# Patient Record
Sex: MALE | Race: WHITE | Employment: FULL TIME | URBAN - METROPOLITAN AREA
[De-identification: names, ages, dates, MRNs, and addresses within clinical notes are randomized per-mention and may not be internally consistent; named-entity substitution may affect disease eponyms.]

---

## 2021-08-16 ENCOUNTER — HOSPITAL ENCOUNTER (OUTPATIENT)
Dept: PHYSICAL THERAPY | Age: 56
Setting detail: THERAPIES SERIES
Discharge: HOME | End: 2021-08-16
Payer: COMMERCIAL

## 2021-08-16 PROCEDURE — 97161 PT EVAL LOW COMPLEX 20 MIN: CPT

## 2021-08-16 PROCEDURE — 97112 NEUROMUSCULAR REEDUCATION: CPT

## 2021-08-16 PROCEDURE — 97110 THERAPEUTIC EXERCISES: CPT

## 2021-08-16 NOTE — FLOWSHEET NOTE
[address] [facility],  [address]  Phone: [phone]   Fax:     [phone]    Physical Therapy Daily Treatment Note  Date:  2021    Patient Name:  Sparkle Lau    :  1965  MRN: [id number]  Restrictions/Precautions:    Medical/Treatment Diagnosis Information:  · Diagnosis: M72.2 Plantar Fasciitis  · Treatment Diagnosis: M79.671, M79.672 bilateral foot pain  Insurance/Certification information:  PT Insurance Information: [employer]  Physician Information:  Referring Practitioner: Dr. Medardo Simon  Has the plan of care been signed (Y/N):        []  Yes  [x]  No     Date of Patient follow up with Physician:       Is this a Progress Report:     []  Yes  [x]  No        If Yes:  Date Range for reporting period:  Beginning 21  Ending    Progress report will be due (10 Rx or 30 days whichever is less):        Recertification will be due (POC Duration  / 90 days whichever is less): 10/11/21         Visit # Insurance Allowable Auth Required   1 [employer]  60 visits []  Yes [x]  No        Functional Scale: LEFI 42 % deficit  Date assessed:         Latex Allergy:  [x]NO      []YES  Preferred Language for Healthcare:   [x]English       []other:      Pain level:  3/10     SUBJECTIVE:  See eval    OBJECTIVE: See eval   Observation:    Test measurements:      RESTRICTIONS/PRECAUTIONS: HTN, Fibromyalgia     Exercises/Interventions:     Exercise/Equipment Resistance/Repetitions Other comments   Stretching/ROM     Calf stretch 30\"hx3 bilat 8/16 Long sitting with strap   Hamstring stertch 30\"hx3 bilat 8/16 seated EOT   DF self mobilization 10\"hx10 bilat  seated in chair   Heel/ toe raise 3x10 bilat 816 seated             Isometrics     Dorsiflexion     Plantarflexion     Inversion     Eversion          PRE's     Dorsiflexion     Plantarflexion     Inversion     Eversion     Heel walk     Toe walk     SLR Calf Raises     Step Up     Knee Extension     Hamstring Curls     Leg Press     Arch lifts 2x10 bilat 8/16 seated    Toe curls 2x10 bilat 8/16 seated        Balance:     Rocker Board     BOSU     SLS     Aeromat     Foam Roll     Plyoback     Tandem Stance     Biodex          Bike     Treadmill          Manual interventions          8/16 Educated on proper muscle activation and foot posture based on symptoms and objective deficits. Multiple verbal and tactile cues required for proper posterior tibialis activation in order to decrease forefoot pronation. Therapeutic Exercise and NMR EXR  [x] (24885) Provided verbal/tactile cueing for activities related to strengthening, flexibility, endurance, ROM for improvements in LE, proximal hip, and core control with self care, mobility, lifting, ambulation. [x] (62876) Provided verbal/tactile cueing for activities related to improving balance, coordination, kinesthetic sense, posture, motor skill, proprioception  to assist with LE, proximal hip, and core control in self care, mobility, lifting, ambulation and eccentric single leg control.      NMR and Therapeutic Activities:    [] (82808 or 56330) Provided verbal/tactile cueing for activities related to improving balance, coordination, kinesthetic sense, posture, motor skill, proprioception and motor activation to allow for proper function of core, proximal hip and LE with self care and ADLs  [] (48780) Gait Re-education- Provided training and instruction to the patient for proper LE, core and proximal hip recruitment and positioning and eccentric body weight control with ambulation re-education including up and down stairs     Home Exercise Program:    [x] (14273) Reviewed/Progressed HEP activities related to strengthening, flexibility, endurance, ROM of core, proximal hip and LE for functional self-care, mobility, lifting and ambulation/stair navigation   [] (33922)Reviewed/Progressed HEP activities related to improving balance, coordination, kinesthetic sense, posture, motor skill, proprioception of core, proximal hip and LE for self care, mobility, lifting, and ambulation/stair navigation      Manual Treatments:  PROM / STM / Oscillations-Mobs:  G-I, II, III, IV (PA's, Inf., Post.)  [] (16522) Provided manual therapy to mobilize LE, proximal hip and/or LS spine soft tissue/joints for the purpose of modulating pain, promoting relaxation,  increasing ROM, reducing/eliminating soft tissue swelling/inflammation/restriction, improving soft tissue extensibility and allowing for proper ROM for normal function with self care, mobility, lifting and ambulation. Modalities:      Charges:  Timed Code Treatment Minutes: 25'+eval   Total Treatment Minutes: 11:04-12:12  68'     [x] EVAL (LOW) 68671 (typically 20 minutes face-to-face)  [] EVAL (MOD) 72773 (typically 30 minutes face-to-face)  [] EVAL (HIGH) 12691 (typically 45 minutes face-to-face)  [] RE-EVAL     [x] SI(02916) x 1    [] IONTO  [x] NMR (17739) x  1   [] VASO  [] Manual (81394) x      [] Other:  [] TA x      [] Mech Traction (30884)  [] ES(attended) (77755)      [] ES (un) (74535):     GOALS:   GOALS:   Patient stated goal: Return to walking pain free     []? Progressing: []? Met: []? Not Met: []? Adjusted     Therapist goals for Patient:   Short Term Goals: To be achieved in: 4 weeks  1. Independent in HEP and progression per patient tolerance, in order to prevent re-injury. []? Progressing: []? Met: []? Not Met: []? Adjusted   2. Patient will have a decrease in pain to facilitate improvement in movement, function, and ADLs as indicated by Functional Deficits. []? Progressing: []? Met: []? Not Met: []? Adjusted     Long Term Goals: To be achieved in: 8 weeks  1. Disability index score of 15% or less for the LEFS to assist with reaching prior level of function. []? Progressing: []? Met: []? Not Met: []? Adjusted  2.  Patient will demonstrate increased AROM to 10 deg DF bilaterally with knees extended to allow for proper joint functioning as indicated by patients Functional Deficits. []? Progressing: []? Met: []? Not Met: []? Adjusted  3. Patient will demonstrate an increase in ankle strength to 5/5 in all directions tested to allow for proper functional mobility as indicated by patients Functional Deficits. []? Progressing: []? Met: []? Not Met: []? Adjusted  4. Patient will return to all functional activities without increased symptoms or restriction. []? Progressing: []? Met: []? Not Met: []? Adjusted  5. Patient will be able to walk over 500 ft with proper gait sequencing and no c/o pain. []? Progressing: []? Met: []? Not Met: []? Adjusted         Overall Progression Towards Functional goals/ Treatment Progress Update:  [] Patient is progressing as expected towards functional goals listed. [] Progression is slowed due to complexities/Impairments listed. [] Progression has been slowed due to co-morbidities. [x] Plan just implemented, too soon to assess goals progression <30days   [] Goals require adjustment due to lack of progress  [] Patient is not progressing as expected and requires additional follow up with physician  [] Other    Prognosis for POC: [x] Good [] Fair  [] Poor      Patient requires continued skilled intervention: [x] Yes  [] No    Treatment/Activity Tolerance:  [x] Patient able to complete treatment  [] Patient limited by fatigue  [] Patient limited by pain     [] Patient limited by other medical complications  [] Other: 8/16 Significant muscle tightness noted that responded well to stretches. Cues required during DF self mobilization to avoid lifting heel or everting foot. Patient Education:              8/16 Educated pt on proper foot posture/ mechanics, use of ice if tolerable and importance of consistency with HEP. Educated on potential use of IASTM or Dry needling if symptoms improve with stretching.        PLAN: See eval  [x] Continue per plan of care [] Alter current plan (see comments above)  [x] Plan of care initiated [] Hold pending MD visit [] Discharge  8/16 Pt going out of town and will return to PT 8/27 when he gets back from his trip. Electronically signed by:  Nigel Bunn, PT    Note: If patient does not return for scheduled/ recommended follow up visits, this note will serve as a discharge from care along with most recent update on progress.

## 2021-08-16 NOTE — PLAN OF CARE
The [address] [place] [address] and [address]  Phone [phone]  Fax [phone]      Physical Therapy Certification    Dear Referring Practitioner: Dr. Chacho Camop,    We had the pleasure of evaluating the following patient for physical therapy services at [address] [place] [address]. A summary of our findings can be found in the initial assessment below. This includes our plan of care. If you have any questions or concerns regarding these findings, please do not hesitate to contact me at the office phone number checked above. Thank you for the referral.       Physician Signature:_______________________________Date:__________________  By signing above (or electronic signature), therapists plan is approved by physician      Patient: Manasa Larios   : 1965   MRN: [id number]  Referring Physician: Referring Practitioner: Dr. Chacho Campo      Evaluation Date: 2021      Medical Diagnosis Information:  Diagnosis: M72.2 Plantar Fasciitis   Treatment Diagnosis: M79.671, M79.672 bilateral foot pain                                         Insurance information: PT Insurance Information: [employer]     Precautions/ Contra-indications: HTN  Latex Allergy:  [x]NO      []YES  Preferred Language for Healthcare:   [x]English       []other:    SUBJECTIVE: Patient arrived to PT with c/o bilateral foot pain that has been present since 2020. Symptoms located bilateral heels and top of the foot is worse on the L compared to R. Symptoms worse in the morning and progressively got worse in the morning. Also reports that pain will wake him up in the middle of the night. PT has had x-ray and diagnosed with plantar fasciitis. Attempted strengthening and stretching independently for 2 weeks with no relief in symptoms.  D/t continued symptoms he was given a injection to L heel which has provided enough relief allowing him to stand on L foot but still has bilateral heel pain and pain at top of L foot. Has recently purchased more supportive shoes but has not attempted to wear inserts. Relevant Medical History: HTN controlled with medication. Denies previous LE pathologies. Fibromyalgia. See attached medical history. Functional Disability Index: LEFI 42% deficit     Pain Scale: 3-8/10  Easing factors: Rest  Provocative factors: Prolonged standing and walking. Type: [x]Constant   []Intermittent  []Radiating []Localized []other:     Numbness/Tingling: Pt states numbness in bilateral feet occasionally when he sits for prolonged periods of time that goes away with movement. Occupation/School: Desk work     Living Status/Prior Level of Function: Independent with ADLs and IADLs, walking for exer    OBJECTIVE:   ROM PROM AROM Comments    Left Right Left Right    Dorsiflexion 0 knee extended   10 knee flexed -2 knee extended  10 knee flexed -2 knee extended  7 knee flexed  -4 knee extended   7 knee flexed    Plantarflexion 67 pain top of foot 68 pain located top of foot  65 66    Inversion 45 lateral ankle pain 45 35 35 medial ankle pain     Eversion 17 17 10 12                      Strength Left Right Comments   Dorsiflexion 4 4    Plantarflexion 5 5    Inversion 4 4    Eversion 4- 4-      Special tests Left Right Comments   Calcaneal varus/valgus overpressure Neg Neg                          Reflexes/Sensation:    [x]Dermatomes/Myotomes intact    []Reflexes equal and normal bilaterally   []Other:    Joint mobility:    [x]Normal 1st metatarsal bilat    [x]Hypo Mild muscle guarding and restriction Talocrural with L slightly worse then R   []Hyper    Palpation: Tenderness bilateral medial and plantar aspect of calcaneus, navicular and plantar fascia. Tenderness L dorsal aspect of foot along metatarsals.      Functional Mobility/Transfers: Independent    Posture: Calcaneal valgus in standing with forefoot pronation L>R. Bilateral hallux valgus     Bandages/Dressings/Incisions: N/A    Gait: Decrease heel strike bilaterally d/t pain. Toe out bilaterally with excessive forefoot pronation L > R    Orthopedic Functional Movement Tests:   Single leg stance: EO >10\" bilat with mild to moderate postural sway, no increase in symptoms   Singe leg heel raise: x5 ea with proper height and no increase in symptoms. Bilateral squat: Bilateral hip ER with ankle eversion and forefoot pronation                   [x] Patient history, allergies, meds reviewed. Medical chart reviewed. See intake form. Review Of Systems (ROS):  [x]Performed Review of systems (Integumentary, CardioPulmonary, Neurological) by intake and observation. Intake form has been scanned into medical record. Patient has been instructed to contact their primary care physician regarding ROS issues if not already being addressed at this time.       Co-morbidities/Complexities (which will affect course of rehabilitation):   []None           Arthritic conditions   []Rheumatoid arthritis (M05.9)  []Osteoarthritis (M19.91)   Cardiovascular conditions   [x]Hypertension (I10)  []Hyperlipidemia (E78.5)  []Angina pectoris (I20)  []Atherosclerosis (I70)   Musculoskeletal conditions   []Disc pathology   []Congenital spine pathologies   []Prior surgical intervention  []Osteoporosis (M81.8)  []Osteopenia (M85.8)   Endocrine conditions   []Hypothyroid (E03.9)  []Hyperthyroid Gastrointestinal conditions   []Constipation (J06.01)   Metabolic conditions   []Morbid obesity (E66.01)  []Diabetes type 1(E10.65) or 2 (E11.65)   []Neuropathy (G60.9)     Pulmonary conditions   []Asthma (J45)  []Coughing   []COPD (J44.9)   Psychological Disorders  []Anxiety (F41.9)  []Depression (F32.9)   []Other:   [x]Other:     Fibromyalgia      Barriers to/and or personal factors that will affect rehab potential:              [x]Age  []Sex              []Motivation/Lack of Motivation []Co-Morbidities              []Cognitive Function, education/learning barriers              []Environmental, home barriers              [x]profession/work barriers  []past PT/medical experience  []other:  Justification: Pts age, chronic nature of symptoms and sedentary job duties as well as going on a trip next week are potential barriers to treatment. Falls Risk Assessment (30 days):   [x] Falls Risk assessed and no intervention required. [] Falls Risk assessed and Patient requires intervention due to being higher risk   TUG score (>12s at risk):     [] Falls education provided, including         ASSESSMENT:    Functional Impairments:     [x]Decreased LE joint mobility   [x]Decreased LE functional ROM   []Decreased core/proximal hip strength and neuromuscular control   [x]Decreased LE functional strength  []Reduced balance/proprioceptive control    [x]Foot biomechanics dysfunction requiring correction   []other:    Functional Activity Limitations (from functional questionnaire and intake)   [x]Reduced ability to tolerate prolonged functional positions   [x]Reduced ability or difficulty with changes of positions or transfers between positions   [x]Reduced ability to maintain good posture and demonstrate good body mechanics with sitting, bending, and lifting   [x]Reduced ability to sleep   [] Reduced ability or tolerance with driving    []Reduced ability to squat  Participation Restrictions   []Reduced participation in self care activities   []Reduced participation in home management activities   []Reduced participation in work activities   []Reduced participation in social activities. []Reduced participation in sport activities. Classification :    []Signs/symptoms consistent with post-surgical status including decreased ROM, strength and function.    []Signs/symptoms consistent with joint sprain/strain   []Signs/symptoms consistent with patella-femoral syndrome   []Signs/symptoms consistent with knee OA/hip OA   []Signs/symptoms consistent with internal derangement of knee/Hip/ankle   []Signs/symptoms consistent with functional hip/knee/ankle-foot weakness/NMR control      []Signs/symptoms consistent with tendinitis/tendinosis    []signs/symptoms consistent with pathology which may benefit from Dry needling      [x]other: Signs/symptoms consistent with bilateral plantar fasciitis      Prognosis/Rehab Potential:      [] Excellent   [x]Good    []Fair   []Poor    Tolerance of evaluation/treatment:    []Excellent   [x]Good    []Fair   []Poor    Physical Therapy Evaluation Complexity Justification  [x] A history of present problem with:  [] no personal factors and/or comorbidities that impact the plan of care;  [x]1-2 personal factors and/or comorbidities that impact the plan of care  []3 personal factors and/or comorbidities that impact the plan of care  [x] An examination of body systems using standardized tests and measures addressing any of the following: body structures and functions (impairments), activity limitations, and/or participation restrictions;:  [x] a total of 1-2 or more elements   [] a total of 3 or more elements   [] a total of 4 or more elements   [x] A clinical presentation with:  [x] stable and/or uncomplicated characteristics   [] evolving clinical presentation with changing characteristics  [] unstable and unpredictable characteristics;   [x] Clinical decision making of [x] low, [] moderate, [] high complexity using standardized patient assessment instrument and/or measurable assessment of functional outcome.     [x] EVAL (LOW) 79800 (typically 20 minutes face-to-face)  [] EVAL (MOD) 18361 (typically 30 minutes face-to-face)  [] EVAL (HIGH) 77470 (typically 45 minutes face-to-face)  [] RE-EVAL       PLAN  Frequency/Duration:  1-2 days per week for 8 Weeks:  Interventions:  [x]  Therapeutic exercise including: strength training, ROM, for Lower extremity and core   [x]  NMR activation Patient will demonstrate an increase in ankle strength to 5/5 in all directions tested to allow for proper functional mobility as indicated by patients Functional Deficits. [] Progressing: [] Met: [] Not Met: [] Adjusted  4. Patient will return to all functional activities without increased symptoms or restriction. [] Progressing: [] Met: [] Not Met: [] Adjusted  5. Patient will be able to walk over 500 ft with proper gait sequencing and no c/o pain. [] Progressing: [] Met: [] Not Met: [] Adjusted         Electronically signed by:  Karlee Fox PT  *If patient does not return for further follow ups after this date. Please consider this as the patients discharge from physical therapy.

## 2021-08-27 ENCOUNTER — HOSPITAL ENCOUNTER (OUTPATIENT)
Dept: PHYSICAL THERAPY | Age: 56
Setting detail: THERAPIES SERIES
Discharge: HOME | End: 2021-08-27
Payer: COMMERCIAL

## 2021-08-27 PROCEDURE — 97110 THERAPEUTIC EXERCISES: CPT

## 2021-08-27 PROCEDURE — 97140 MANUAL THERAPY 1/> REGIONS: CPT

## 2021-08-27 NOTE — FLOWSHEET NOTE
[place] [id number] [address]  Phone: [phone]   Fax:     [phone]    Physical Therapy Daily Treatment Note  Date:  2021    Patient Name:  Lang Lugo    :  1965  MRN: [id number]  Restrictions/Precautions:    Medical/Treatment Diagnosis Information:  · Diagnosis: M72.2 Plantar Fasciitis  · Treatment Diagnosis: M79.671, M79.672 bilateral foot pain  Insurance/Certification information:   [facility], later [facility]  Physician Information:   Dr. Maxine Hassan  Has the plan of care been signed (Y/N):        []  Yes  [x]  No     Date of Patient follow up with Physician:       Is this a Progress Report:     []  Yes  [x]  No        If Yes:  Date Range for reporting period:  Beginning 21  Ending    Progress report will be due (10 Rx or 30 days whichever is less):        Recertification will be due (POC Duration  / 90 days whichever is less): 10/11/21         Visit # Insurance Allowable Auth Required   2   [facility], [facility]  60 visits []  Yes [x]  No        Functional Scale: LEFI 42 % deficit  Date assessed:         Latex Allergy:  [x]NO      []YES  Preferred Language for Healthcare:   [x]English       []other:      Pain level:  4/10 L foot, 1/10 R foot      SUBJECTIVE:  Pt states he was consistent with HEP but did not notice any change and feels it was related to being on his feet a lot while on his trip.  Continues to have pain at top of L foot and heel on R.     OBJECTIVE: See eval   Observation:    Test measurements:      RESTRICTIONS/PRECAUTIONS: HTN, Fibromyalgia     Exercises/Interventions:     Exercise/Equipment Resistance/Repetitions Other comments   Stretching/ROM     Calf stretch 30\"hx3 bilat ^ completed on slant board   Hamstring stertch 30\"hx3 bilat ^ supine with strap    DF self mobilization 10\"hx10 bilat ^ seated with pro stretch   Anterior tibialis 10\"hx10 L  seated   Heel raise 3x10 bilat ^8/27 standing             Isometrics     Dorsiflexion     Plantarflexion     Inversion     Eversion          PRE's     Dorsiflexion     Plantarflexion Blue TB 3x10 bilat 8/27 eccentric return   Inversion     Eversion     Heel walk     Toe walk     SLR     Calf Raises     Step Up     Knee Extension     Hamstring Curls     Leg Press     Arch lifts 8/27 cont with HEP, withheld d/t time   Toe curls 8/27 cont with HEP, withheld d/t time        Balance:     Rocker Board     BOSU     SLS     Aeromat     Foam Roll     Plyoback     Tandem Stance     Biodex          Bike     Treadmill          Manual interventions     Joint mobilization A-P Grade 3-4 Talocrural 10\"x10 bilat 8/27   IASTM L 1st MTP, bilat medial distal gastroc and achilles 8/27 x8'            Therapeutic Exercise and NMR EXR  [x] (65401) Provided verbal/tactile cueing for activities related to strengthening, flexibility, endurance, ROM for improvements in LE, proximal hip, and core control with self care, mobility, lifting, ambulation. [x] (15356) Provided verbal/tactile cueing for activities related to improving balance, coordination, kinesthetic sense, posture, motor skill, proprioception  to assist with LE, proximal hip, and core control in self care, mobility, lifting, ambulation and eccentric single leg control.      NMR and Therapeutic Activities:    [] (81496 or 12778) Provided verbal/tactile cueing for activities related to improving balance, coordination, kinesthetic sense, posture, motor skill, proprioception and motor activation to allow for proper function of core, proximal hip and LE with self care and ADLs  [] (28911) Gait Re-education- Provided training and instruction to the patient for proper LE, core and proximal hip recruitment and positioning and eccentric body weight control with ambulation re-education including up and down stairs     Home Exercise Program:    [x] (57536) Reviewed/Progressed HEP activities related to strengthening, flexibility, endurance, ROM of core, proximal hip and LE for functional self-care, mobility, lifting and ambulation/stair navigation   [] (40422)Reviewed/Progressed HEP activities related to improving balance, coordination, kinesthetic sense, posture, motor skill, proprioception of core, proximal hip and LE for self care, mobility, lifting, and ambulation/stair navigation      Manual Treatments:  PROM / STM / Oscillations-Mobs:  G-I, II, III, IV (PA's, Inf., Post.)  [x] (17442) Provided manual therapy to mobilize LE, proximal hip and/or LS spine soft tissue/joints for the purpose of modulating pain, promoting relaxation,  increasing ROM, reducing/eliminating soft tissue swelling/inflammation/restriction, improving soft tissue extensibility and allowing for proper ROM for normal function with self care, mobility, lifting and ambulation. Modalities:      Charges:  Timed Code Treatment Minutes: 40'   Total Treatment Minutes: 4:50-5:30  40'     [] EVAL (LOW) 455 1011 (typically 20 minutes face-to-face)  [] EVAL (MOD) 00387 (typically 30 minutes face-to-face)  [] EVAL (HIGH) 34776 (typically 45 minutes face-to-face)  [] RE-EVAL     [x] SC(01214) x 2    [] IONTO  [] NMR (73071) x     [] VASO  [x] Manual (89859) x  1    [] Other:  [] TA x      [] Mech Traction (70472)  [] ES(attended) (95757)      [] ES (un) (06286):     GOALS:   GOALS:   Patient stated goal: Return to walking pain free     []? Progressing: []? Met: []? Not Met: []? Adjusted     Therapist goals for Patient:   Short Term Goals: To be achieved in: 4 weeks  1. Independent in HEP and progression per patient tolerance, in order to prevent re-injury. []? Progressing: []? Met: []? Not Met: []? Adjusted   2. Patient will have a decrease in pain to facilitate improvement in movement, function, and ADLs as indicated by Functional Deficits. []? Progressing: []? Met: []? Not Met: []? Adjusted     Long Term Goals: To be achieved in: 8 weeks  1. Disability index score of 15% or less for the LEFS to assist with reaching prior level of function. []? Progressing: []? Met: []? Not Met: []? Adjusted  2. Patient will demonstrate increased AROM to 10 deg DF bilaterally with knees extended to allow for proper joint functioning as indicated by patients Functional Deficits. []? Progressing: []? Met: []? Not Met: []? Adjusted  3. Patient will demonstrate an increase in ankle strength to 5/5 in all directions tested to allow for proper functional mobility as indicated by patients Functional Deficits. []? Progressing: []? Met: []? Not Met: []? Adjusted  4. Patient will return to all functional activities without increased symptoms or restriction. []? Progressing: []? Met: []? Not Met: []? Adjusted  5. Patient will be able to walk over 500 ft with proper gait sequencing and no c/o pain. []? Progressing: []? Met: []? Not Met: []? Adjusted         Overall Progression Towards Functional goals/ Treatment Progress Update:  [] Patient is progressing as expected towards functional goals listed. [] Progression is slowed due to complexities/Impairments listed. [] Progression has been slowed due to co-morbidities. [x] Plan just implemented, too soon to assess goals progression <30days   [] Goals require adjustment due to lack of progress  [] Patient is not progressing as expected and requires additional follow up with physician  [] Other    Prognosis for POC: [x] Good [] Fair  [] Poor      Patient requires continued skilled intervention: [x] Yes  [] No    Treatment/Activity Tolerance:  [x] Patient able to complete treatment  [] Patient limited by fatigue  [] Patient limited by pain     [] Patient limited by other medical complications  [] Other: 8/27 Responded well to IASTM with reported decrease in tightness. Cues required during TB PF to focus on eccentric return.      Patient Education:              8/27 Updated HEP   8/16 Educated pt on proper foot posture/ mechanics, use of ice if tolerable and importance of consistency with HEP. Educated on potential use of IASTM or Dry needling if symptoms improve with stretching. HEP instruction:  Access Code: [email or web address]    PLAN: See eval  [x] Continue per plan of care [] Alter current plan (see comments above)  [x] Plan of care initiated [] Hold pending MD visit [] Discharge  8/27 progress strength and mobility as tolerated. Electronically signed by:  Stefan Braga PT    Note: If patient does not return for scheduled/ recommended follow up visits, this note will serve as a discharge from care along with most recent update on progress.

## 2021-09-03 ENCOUNTER — HOSPITAL ENCOUNTER (OUTPATIENT)
Dept: PHYSICAL THERAPY | Age: 56
Setting detail: THERAPIES SERIES
Discharge: HOME | End: 2021-09-03
Payer: COMMERCIAL

## 2021-09-03 PROCEDURE — 97110 THERAPEUTIC EXERCISES: CPT

## 2021-09-03 PROCEDURE — 97140 MANUAL THERAPY 1/> REGIONS: CPT

## 2021-09-03 NOTE — FLOWSHEET NOTE
The [address] [place] [address],[address], [address] [facility], [address] [place] [address]  Phone: [phone]   Fax:     [phone]    Physical Therapy Daily Treatment Note  Date:  9/3/2021    Patient Name:  Lang Lugo    :  1965  MRN: [id number]  Restrictions/Precautions:    Medical/Treatment Diagnosis Information:  · Diagnosis: M72.2 Plantar Fasciitis  · Treatment Diagnosis: M79.671, M79.672 bilateral foot pain  Insurance/Certification information:   [employer]  Physician Information:   Dr. Maxine Hassan  Has the plan of care been signed (Y/N):        []  Yes  [x]  No     Date of Patient follow up with Physician:       Is this a Progress Report:     []  Yes  [x]  No        If Yes:  Date Range for reporting period:  Beginning 21  Ending    Progress report will be due (10 Rx or 30 days whichever is less):        Recertification will be due (POC Duration  / 90 days whichever is less): 10/11/21         Visit # Insurance Allowable Auth Required   3  9/3 [facility]  60 visits []  Yes [x]  No        Functional Scale: LEFI 42 % deficit  Date assessed:         Latex Allergy:  [x]NO      []YES  Preferred Language for Healthcare:   [x]English       []other:      Pain level: 1/10 bilateral feet 9/3     SUBJECTIVE:  9/3 Pt states he experienced some relief after last visit. Today he walked for 1.5 miles with new shoes and tolerated it well with tension at top of bilateral feet.      OBJECTIVE: See eval   Observation:    Test measurements:      RESTRICTIONS/PRECAUTIONS: HTN, Fibromyalgia     Exercises/Interventions:     Exercise/Equipment Resistance/Repetitions Other comments   Stretching/ROM     Calf stretch 30\"hx3 bilat ^ completed on slant board   Hamstring stertch 30\"hx3 bilat ^ supine with strap    DF self mobilization 10\"hx10 bilat ^ seated with pro stretch   9/3 cont with HEP as needed   Heel raise 3x10 bilat 9/3 completed at 4\" step with lower into DF stretch             Isometrics     Dorsiflexion     Plantarflexion     Inversion     Eversion          PRE's     Dorsiflexion     Plantarflexion Black TB 3x10 bilat ^9/3 eccentric return   Inversion Blue TB 3x10 bilat 9/3   Eversion Blue TB 3x10 bilat 9/3   Heel walk     Toe walk     SLR     Calf Raises     Step Up     Knee Extension     Hamstring Curls     Leg Press     Arch lifts 8/27 cont with HEP, withheld d/t time   Toe curls 8/27 cont with HEP, withheld d/t time        Balance:     Rocker Board     BOSU     SLS     Aeromat     Foam Roll     Plyoback     Tandem Stance     Biodex          Bike     Treadmill          Manual interventions     Joint mobilization A-P Grade 3-4 Talocrural 10\"x10 bilat  Sup-inf 1st metatarsal 10\"x2 R, 10\"x5 L 9/3   IASTM L 1st MTP, bilat medial distal gastroc and achilles 9/3 x8'            Therapeutic Exercise and NMR EXR  [x] (74658) Provided verbal/tactile cueing for activities related to strengthening, flexibility, endurance, ROM for improvements in LE, proximal hip, and core control with self care, mobility, lifting, ambulation. [x] (42396) Provided verbal/tactile cueing for activities related to improving balance, coordination, kinesthetic sense, posture, motor skill, proprioception  to assist with LE, proximal hip, and core control in self care, mobility, lifting, ambulation and eccentric single leg control.      NMR and Therapeutic Activities:    [] (37179 or 16093) Provided verbal/tactile cueing for activities related to improving balance, coordination, kinesthetic sense, posture, motor skill, proprioception and motor activation to allow for proper function of core, proximal hip and LE with self care and ADLs  [] (41402) Gait Re-education- Provided training and instruction to the patient for proper LE, core and proximal hip recruitment and positioning and eccentric body weight control with ambulation re-education including up and down stairs     Home Exercise Program: [x] (07259) Reviewed/Progressed HEP activities related to strengthening, flexibility, endurance, ROM of core, proximal hip and LE for functional self-care, mobility, lifting and ambulation/stair navigation   [] (13024)Reviewed/Progressed HEP activities related to improving balance, coordination, kinesthetic sense, posture, motor skill, proprioception of core, proximal hip and LE for self care, mobility, lifting, and ambulation/stair navigation      Manual Treatments:  PROM / STM / Oscillations-Mobs:  G-I, II, III, IV (PA's, Inf., Post.)  [x] (79049) Provided manual therapy to mobilize LE, proximal hip and/or LS spine soft tissue/joints for the purpose of modulating pain, promoting relaxation,  increasing ROM, reducing/eliminating soft tissue swelling/inflammation/restriction, improving soft tissue extensibility and allowing for proper ROM for normal function with self care, mobility, lifting and ambulation. Modalities:      Charges:  Timed Code Treatment Minutes: 43'   Total Treatment Minutes: 12:14-12:56  43'     [] EVAL (LOW) 17603 (typically 20 minutes face-to-face)  [] EVAL (MOD) 08562 (typically 30 minutes face-to-face)  [] EVAL (HIGH) 27374 (typically 45 minutes face-to-face)  [] RE-EVAL     [x] EJ(29051) x 2    [] IONTO  [] NMR (52602) x     [] VASO  [x] Manual (34281) x  1    [] Other:  [] TA x      [] Mech Traction (55372)  [] ES(attended) (31796)      [] ES (un) (14219):     GOALS:   GOALS:   Patient stated goal: Return to walking pain free     []? Progressing: []? Met: []? Not Met: []? Adjusted     Therapist goals for Patient:   Short Term Goals: To be achieved in: 4 weeks  1. Independent in HEP and progression per patient tolerance, in order to prevent re-injury. []? Progressing: []? Met: []? Not Met: []? Adjusted   2. Patient will have a decrease in pain to facilitate improvement in movement, function, and ADLs as indicated by Functional Deficits. []? Progressing: []? Met: []?  Not Met: []? Adjusted     Long Term Goals: To be achieved in: 8 weeks  1. Disability index score of 15% or less for the LEFS to assist with reaching prior level of function. []? Progressing: []? Met: []? Not Met: []? Adjusted  2. Patient will demonstrate increased AROM to 10 deg DF bilaterally with knees extended to allow for proper joint functioning as indicated by patients Functional Deficits. []? Progressing: []? Met: []? Not Met: []? Adjusted  3. Patient will demonstrate an increase in ankle strength to 5/5 in all directions tested to allow for proper functional mobility as indicated by patients Functional Deficits. []? Progressing: []? Met: []? Not Met: []? Adjusted  4. Patient will return to all functional activities without increased symptoms or restriction. []? Progressing: []? Met: []? Not Met: []? Adjusted  5. Patient will be able to walk over 500 ft with proper gait sequencing and no c/o pain. []? Progressing: []? Met: []? Not Met: []? Adjusted         Overall Progression Towards Functional goals/ Treatment Progress Update:  [] Patient is progressing as expected towards functional goals listed. [] Progression is slowed due to complexities/Impairments listed. [] Progression has been slowed due to co-morbidities. [x] Plan just implemented, too soon to assess goals progression <30days   [] Goals require adjustment due to lack of progress  [] Patient is not progressing as expected and requires additional follow up with physician  [] Other    Prognosis for POC: [x] Good [] Fair  [] Poor      Patient requires continued skilled intervention: [x] Yes  [] No    Treatment/Activity Tolerance:  [x] Patient able to complete treatment  [] Patient limited by fatigue  [] Patient limited by pain     [] Patient limited by other medical complications  [] Other: 9/3 Pt continues to respond well to IASTM and joint mobilization with reported decrease in tightness.  Cues required during TB exercises to control foot through entire motion. Fatigued and challenged with heel raise at step but no increase in symptoms. Patient Education:              9/3 Updated HEP based on tolerance to treatment  8/27 Updated HEP   8/16 Educated pt on proper foot posture/ mechanics, use of ice if tolerable and importance of consistency with HEP. Educated on potential use of IASTM or Dry needling if symptoms improve with stretching. HEP instruction:  Access Code: [email or web address]    PLAN: See eval  [x] Continue per plan of care [] Alter current plan (see comments above)  [x] Plan of care initiated [] Hold pending MD visit [] Discharge  9/3 Monitor symptoms and progress strength and mobility as tolerated. Electronically signed by:  Nigel Bunn PT    Note: If patient does not return for scheduled/ recommended follow up visits, this note will serve as a discharge from care along with most recent update on progress.

## 2021-09-10 ENCOUNTER — APPOINTMENT (OUTPATIENT)
Dept: PHYSICAL THERAPY | Age: 56
End: 2021-09-10
Payer: COMMERCIAL

## 2021-09-24 ENCOUNTER — HOSPITAL ENCOUNTER (OUTPATIENT)
Dept: PHYSICAL THERAPY | Age: 56
Setting detail: THERAPIES SERIES
Discharge: HOME | End: 2021-09-24
Payer: COMMERCIAL

## 2021-09-24 NOTE — FLOWSHEET NOTE
Physical Therapy  Cancellation/No-show Note  Patient Name:  John Macedo  :  1965   Date:  2021  Cancelled visits to date: 1  No-shows to date: 0    For today's appointment patient:  [x]  Cancelled  []  Rescheduled appointment  []  No-show     Reason given by patient:  []  Patient ill  []  Conflicting appointment  []  No transportation    []  Conflict with work  [x]  No reason given  []  Other:     Comments:      Electronically signed by:  Marlee Bhatt PT, PT

## 2022-08-15 ENCOUNTER — OFFICE VISIT (OUTPATIENT)
Dept: SURGERY | Age: 57
End: 2022-08-15
Payer: COMMERCIAL

## 2022-08-15 VITALS
TEMPERATURE: 97.4 F | WEIGHT: 212 LBS | DIASTOLIC BLOOD PRESSURE: 85 MMHG | SYSTOLIC BLOOD PRESSURE: 134 MMHG | OXYGEN SATURATION: 98 % | BODY MASS INDEX: 29.68 KG/M2 | RESPIRATION RATE: 16 BRPM | HEART RATE: 88 BPM | HEIGHT: 71 IN

## 2022-08-15 DIAGNOSIS — K64.1 GRADE II HEMORRHOIDS: Primary | ICD-10-CM

## 2022-08-15 PROCEDURE — 99202 OFFICE O/P NEW SF 15 MIN: CPT | Performed by: SURGERY

## 2022-08-15 PROCEDURE — 46221 LIGATION OF HEMORRHOID(S): CPT | Performed by: SURGERY

## 2022-08-15 RX ORDER — AMITRIPTYLINE HYDROCHLORIDE 50 MG/1
50 TABLET, FILM COATED ORAL NIGHTLY
COMMUNITY

## 2022-08-15 NOTE — PROGRESS NOTES
Subjective:     Patient is a 59 y.o. man with hemorrhoids    HPI: Mr. Myrna Goyal reports several months of pain and prolapse from hemorrhoids. No bleeding. Had prior colonoscopy with Dr. Kimi Ricardo and due next year. He is taking some fiber which helps. No Known Allergies   Social History     Tobacco Use    Smoking status: Former     Types: Cigarettes    Smokeless tobacco: Never    Tobacco comments:     Stat he quit smoking about 15 years ago   Substance Use Topics    Alcohol use: Not Currently        Review of Systems    GEN: reviewed and negative except as noted in HPI. GI: reviewed and negative except as noted in HPI. Objective:     GEN: appears well, no distress, appears stated age  PSYCH: normal mood, normal affect  NECK: no neck masses, trachea midline  ENT: moist oral mucosa; anicteric  SKIN: no rash or jaundice  CV: regular heart rate and rhythm  PULM: normal respiratory effort, no wheezing  GI: soft non tender abdomen. Normal bowel sounds  RECTAL: small external hemorrhoids. Left lateral and left anterolateral internal hemorrhoids with small amount of prolapse  EXT/NEURO: normal gait, strength/sensation grossly intact in all extremities      Assessment:     Internal hemorrhoids     Plan:     Hemorrhoid Book handout reviewed. Discussed RBA of banding versus continued fiber and conservative measures. He would like to undergo banding. Consent obtained. Marie Bynum M.D.  8/15/22   2:01 PM      After informed consent was obtained the patient was taken to the clinic room. No anesthesia was indicated. Time out was called to confirm key components. The patient was placed in the lateral position with appropriate padding. We saw hemorrhoids in the following areas: left lateral and left anterolateral. We then placed hemorrhoid bands well above the dentate line on each of these for a total of 2 bands. Procedure was tolerated well with no pain or bleeding.      Dr. Geno Leung was present and performed all aspects of the operation.  There were no residents available     See me 6-8 weeks    Star Armando M.D.  8/15/22   2:02 PM

## 2022-09-26 ENCOUNTER — OFFICE VISIT (OUTPATIENT)
Dept: SURGERY | Age: 57
End: 2022-09-26
Payer: COMMERCIAL

## 2022-09-26 VITALS
TEMPERATURE: 97.7 F | WEIGHT: 216 LBS | HEIGHT: 71 IN | BODY MASS INDEX: 30.24 KG/M2 | DIASTOLIC BLOOD PRESSURE: 84 MMHG | SYSTOLIC BLOOD PRESSURE: 124 MMHG | OXYGEN SATURATION: 98 % | HEART RATE: 84 BPM

## 2022-09-26 DIAGNOSIS — K64.8 INTERNAL HEMORRHOIDS: Primary | ICD-10-CM

## 2022-09-26 PROCEDURE — 99212 OFFICE O/P EST SF 10 MIN: CPT | Performed by: SURGERY

## 2022-09-26 NOTE — PROGRESS NOTES
Subjective:     Patient is a 59 y.o. man with hemorrhoids    HPI: Doing well since last seen. Tolerating diet. Satisfied with hemorrhoid banding and hemorrhoids not bothering him as much. No Known Allergies   Social History     Tobacco Use    Smoking status: Former     Types: Cigarettes    Smokeless tobacco: Never    Tobacco comments:     Stat he quit smoking about 15 years ago   Substance Use Topics    Alcohol use: Not Currently     Review of Systems    GEN: reviewed and negative except as noted in HPI. GI: reviewed and negative except as noted in HPI. Objective:     GEN: appears well, no distress, appears stated age  PSYCH: normal mood, normal affect  NECK: no neck masses, trachea midline  ENT: moist oral mucosa; anicteric  SKIN: no rash or jaundice  CV: regular heart rate and rhythm  PULM: normal respiratory effort, no wheezing  GI: soft non tender abdomen. Normal bowel sounds  RECTAL: examined with Alexander Monae MA present. Internal hemorrhoids improved.  Good scarring seen from prior banding  EXT/NEURO: normal gait, strength/sensation grossly intact in all extremities      Assessment:     Internal hemorrhoids, improved after banding    Plan:     He will see Dr. Garcia Macias next year for colonoscopy    If hemorrhoids worsen see me for repeat banding    Willis Sherman M.D.  9/26/22   10:22 AM